# Patient Record
(demographics unavailable — no encounter records)

---

## 2025-07-17 NOTE — ASSESSMENT
[FreeTextEntry1] : #Seb Derm, chronic, not at goal - itch is improved but pt feels like she may be shedding a bit more - change to Lidex sol QD M-F - SEEN scalp serum - c/w KTZ shampoo  #Trichostasis - improving slowly with retin a-->continue treatment - cautioned full benefit can take a year to show up

## 2025-07-17 NOTE — HISTORY OF PRESENT ILLNESS
[FreeTextEntry1] : RPA- f/u trichostasis spinulosa, sebborheic dermatitis  [de-identified] : Margret Yip 35 y/o F presents for f.u trichostasis spinulosa on face. using tretinoin every other night due to slight irritation. says slight improvement  seb derm is better, less scales, and less itch. using fluocinolone solution every day. LV:6/17/2025

## 2025-07-21 NOTE — PLAN
[FreeTextEntry1] : 35 yo G0 with LMP 6/30 presents for annual exam, also with report of dysmenorrhea, fertility concerns, and with breast mass noted on exam.  - Pap collected today; f/u results - L breast lump: stable and benign per patient but given new onset pain will surveil; f/u mammogram diagnostic w/ ABUS - AUB: f/u US pelvis; suspect underlying ovulatory dysfunction though will r/o structural causes first - Declines contraception today - Requests STD testing; f/u HIV, HepB/C, syphilis, GC/CT - Desires fertility; known risk factor given partner is not able to produce sperm due to loss of testes; patient interested in gestational carrier vs donor sperm and would like [facility] referral for coordination; given info for [facility] and [place] [facility] - Patient requesting hormonal testing prior to seeing [facility]; will f/u on day 3 of menses for AMH, FSH, LH, P4, E2, TSH - RTC pending imaging or as needed  GL PGY3 Evaluated with attending Dr Izaguirre

## 2025-07-21 NOTE — PHYSICAL EXAM
[MA] : MA [Soft] : soft [Non-tender] : non-tender [Examination Of The Breasts] : a normal appearance [___cm] : a ~M [unfilled] ~Ucm inferior medial quadrant mass was palpated [Labia Majora] : normal [Labia Minora] : normal [Pink Rugae] : pink rugae [No Bleeding] : There was no active vaginal bleeding [Normal] : normal [Normal Position] : in a normal position [Uterine Adnexae] : non-palpable [Tenderness] : nontender [Enlarged ___ wks] : not enlarged

## 2025-07-21 NOTE — PLAN
[FreeTextEntry1] : 37 yo G0 with LMP 6/30 presents for annual exam, also with report of dysmenorrhea, fertility concerns, and with breast mass noted on exam.  - Pap collected today; f/u results - L breast lump: stable and benign per patient but given new onset pain will surveil; f/u mammogram diagnostic w/ ABUS - AUB: f/u US pelvis; suspect underlying ovulatory dysfunction though will r/o structural causes first - Declines contraception today - Requests STD testing; f/u HIV, HepB/C, syphilis, GC/CT - Desires fertility; known risk factor given partner is not able to produce sperm due to loss of testes; patient interested in gestational carrier vs donor sperm and would like [facility] referral for coordination; given info for [facility] fertility and [place] clinic - Patient requesting hormonal testing prior to seeing [facility]; will f/u on day 3 of menses for AMH, FSH, LH, P4, E2, TSH - RTC pending imaging or as needed  GL PGY3 Evaluated with attending Dr Izaguirre

## 2025-07-21 NOTE — HISTORY OF PRESENT ILLNESS
[FreeTextEntry1] : 35 yo G0 with LMP 6/30 presents for annual exam. She is concerned about fertility. Her partner had testicular cancer and both testes were removed. Additionally, she reports weight gain recently and she is seeing a nutritionist. Endorses dysmenorrhea, dyspareunia, constipation; denies dysuria, dyschezia.  OB: G0 GYN: LMP 6/30; menses q30 days, lasting x7-10 days, using 6 pads on heaviest day, with clots; Hx abnormal pap testing s/p colposcopy 2019; Genital herpes (ast outbreak 2016); Denies fibroids, ovarian cysts; Denies gardasil vaccination PMH: Hypertension (dx 2016); Hyperlipidemia; Pre-diabetes; Migraines w/ aura PSH: Hand surgery for ganglion cysts MEDS: Carvedilol 6.5 BID; Nurtec QOD; Sertraline 50 QD; Minoxidil cream for hair ALL: NKDA; Peanuts (anaphylaxis) SH: ; sexually active one male partner; denies alcohol, tobacco, drug use;  FH: maternal aunts with breast cancer